# Patient Record
Sex: FEMALE | Race: ASIAN | NOT HISPANIC OR LATINO
[De-identification: names, ages, dates, MRNs, and addresses within clinical notes are randomized per-mention and may not be internally consistent; named-entity substitution may affect disease eponyms.]

---

## 2022-11-29 PROBLEM — Z00.00 ENCOUNTER FOR PREVENTIVE HEALTH EXAMINATION: Status: ACTIVE | Noted: 2022-11-29

## 2022-12-06 ENCOUNTER — APPOINTMENT (OUTPATIENT)
Dept: OBGYN | Facility: HOSPITAL | Age: 37
End: 2022-12-06

## 2022-12-27 ENCOUNTER — APPOINTMENT (OUTPATIENT)
Dept: OBGYN | Facility: CLINIC | Age: 37
End: 2022-12-27
Payer: MEDICAID

## 2022-12-27 VITALS
HEIGHT: 64 IN | HEART RATE: 105 BPM | SYSTOLIC BLOOD PRESSURE: 119 MMHG | DIASTOLIC BLOOD PRESSURE: 83 MMHG | WEIGHT: 195 LBS | BODY MASS INDEX: 33.29 KG/M2

## 2022-12-27 VITALS — HEART RATE: 96 BPM | DIASTOLIC BLOOD PRESSURE: 74 MMHG | SYSTOLIC BLOOD PRESSURE: 119 MMHG

## 2022-12-27 PROCEDURE — 0502F SUBSEQUENT PRENATAL CARE: CPT

## 2022-12-29 LAB
BACTERIA UR CULT: NORMAL
GLUCOSE 1H P 50 G GLC PO SERPL-MCNC: 131 MG/DL

## 2023-01-10 ENCOUNTER — APPOINTMENT (OUTPATIENT)
Dept: OBGYN | Facility: CLINIC | Age: 38
End: 2023-01-10
Payer: MEDICAID

## 2023-01-10 VITALS
DIASTOLIC BLOOD PRESSURE: 73 MMHG | HEART RATE: 98 BPM | WEIGHT: 197 LBS | SYSTOLIC BLOOD PRESSURE: 115 MMHG | HEIGHT: 64 IN | BODY MASS INDEX: 33.63 KG/M2

## 2023-01-10 DIAGNOSIS — Z23 ENCOUNTER FOR IMMUNIZATION: ICD-10-CM

## 2023-01-10 PROCEDURE — 0502F SUBSEQUENT PRENATAL CARE: CPT

## 2023-01-10 PROCEDURE — G0008: CPT

## 2023-01-10 PROCEDURE — 90686 IIV4 VACC NO PRSV 0.5 ML IM: CPT

## 2023-01-13 LAB
BASOPHILS # BLD AUTO: 0.07 K/UL
BASOPHILS NFR BLD AUTO: 0.5 %
EOSINOPHIL # BLD AUTO: 0.12 K/UL
EOSINOPHIL NFR BLD AUTO: 0.8 %
HCT VFR BLD CALC: 31.9 %
HGB BLD-MCNC: 9.6 G/DL
IMM GRANULOCYTES NFR BLD AUTO: 2.1 %
LYMPHOCYTES # BLD AUTO: 2.69 K/UL
LYMPHOCYTES NFR BLD AUTO: 18.1 %
MAN DIFF?: NORMAL
MCHC RBC-ENTMCNC: 25 PG
MCHC RBC-ENTMCNC: 30.1 GM/DL
MCV RBC AUTO: 83.1 FL
MONOCYTES # BLD AUTO: 0.96 K/UL
MONOCYTES NFR BLD AUTO: 6.4 %
NEUTROPHILS # BLD AUTO: 10.74 K/UL
NEUTROPHILS NFR BLD AUTO: 72.1 %
PLATELET # BLD AUTO: 306 K/UL
RBC # BLD: 3.84 M/UL
RBC # FLD: 15.6 %
WBC # FLD AUTO: 14.9 K/UL

## 2023-01-18 ENCOUNTER — NON-APPOINTMENT (OUTPATIENT)
Age: 38
End: 2023-01-18

## 2023-01-18 ENCOUNTER — APPOINTMENT (OUTPATIENT)
Dept: OBGYN | Facility: CLINIC | Age: 38
End: 2023-01-18
Payer: MEDICAID

## 2023-01-18 VITALS
HEIGHT: 64 IN | WEIGHT: 198 LBS | BODY MASS INDEX: 33.8 KG/M2 | DIASTOLIC BLOOD PRESSURE: 76 MMHG | SYSTOLIC BLOOD PRESSURE: 120 MMHG

## 2023-01-18 DIAGNOSIS — R31.9 HEMATURIA, UNSPECIFIED: ICD-10-CM

## 2023-01-18 PROCEDURE — 0502F SUBSEQUENT PRENATAL CARE: CPT

## 2023-01-20 ENCOUNTER — ASOB RESULT (OUTPATIENT)
Age: 38
End: 2023-01-20

## 2023-01-20 ENCOUNTER — APPOINTMENT (OUTPATIENT)
Dept: ANTEPARTUM | Facility: CLINIC | Age: 38
End: 2023-01-20
Payer: MEDICAID

## 2023-01-20 PROCEDURE — 76805 OB US >/= 14 WKS SNGL FETUS: CPT

## 2023-02-07 ENCOUNTER — APPOINTMENT (OUTPATIENT)
Dept: OBGYN | Facility: CLINIC | Age: 38
End: 2023-02-07
Payer: MEDICAID

## 2023-02-07 ENCOUNTER — NON-APPOINTMENT (OUTPATIENT)
Age: 38
End: 2023-02-07

## 2023-02-07 VITALS
DIASTOLIC BLOOD PRESSURE: 71 MMHG | HEIGHT: 64 IN | HEART RATE: 103 BPM | SYSTOLIC BLOOD PRESSURE: 106 MMHG | BODY MASS INDEX: 34.15 KG/M2 | WEIGHT: 200 LBS

## 2023-02-07 DIAGNOSIS — Z98.891 HISTORY OF UTERINE SCAR FROM PREVIOUS SURGERY: ICD-10-CM

## 2023-02-07 PROCEDURE — 90471 IMMUNIZATION ADMIN: CPT

## 2023-02-07 PROCEDURE — 90715 TDAP VACCINE 7 YRS/> IM: CPT

## 2023-02-07 PROCEDURE — 0502F SUBSEQUENT PRENATAL CARE: CPT

## 2023-02-08 LAB — GLUCOSE 1H P 100 G GLC PO SERPL-MCNC: 158 MG/DL

## 2023-02-11 ENCOUNTER — TRANSCRIPTION ENCOUNTER (OUTPATIENT)
Age: 38
End: 2023-02-11

## 2023-02-21 ENCOUNTER — APPOINTMENT (OUTPATIENT)
Dept: OBGYN | Facility: CLINIC | Age: 38
End: 2023-02-21
Payer: MEDICAID

## 2023-02-21 ENCOUNTER — NON-APPOINTMENT (OUTPATIENT)
Age: 38
End: 2023-02-21

## 2023-02-21 VITALS
DIASTOLIC BLOOD PRESSURE: 82 MMHG | BODY MASS INDEX: 33.58 KG/M2 | SYSTOLIC BLOOD PRESSURE: 133 MMHG | HEIGHT: 64 IN | WEIGHT: 196.7 LBS

## 2023-02-21 DIAGNOSIS — Z34.90 ENCOUNTER FOR SUPERVISION OF NORMAL PREGNANCY, UNSPECIFIED, UNSPECIFIED TRIMESTER: ICD-10-CM

## 2023-02-21 DIAGNOSIS — D25.9 LEIOMYOMA OF UTERUS, UNSPECIFIED: ICD-10-CM

## 2023-02-21 PROCEDURE — 0502F SUBSEQUENT PRENATAL CARE: CPT

## 2023-02-21 PROCEDURE — 59025 FETAL NON-STRESS TEST: CPT

## 2023-02-21 RX ORDER — PRENATAL VIT 49/IRON FUM/FOLIC 6.75-0.2MG
TABLET ORAL
Refills: 0 | Status: ACTIVE | COMMUNITY

## 2023-02-22 ENCOUNTER — APPOINTMENT (OUTPATIENT)
Dept: ANTEPARTUM | Facility: CLINIC | Age: 38
End: 2023-02-22
Payer: MEDICAID

## 2023-02-22 ENCOUNTER — ASOB RESULT (OUTPATIENT)
Age: 38
End: 2023-02-22

## 2023-02-22 PROCEDURE — 76819 FETAL BIOPHYS PROFIL W/O NST: CPT | Mod: 59

## 2023-02-22 PROCEDURE — 76816 OB US FOLLOW-UP PER FETUS: CPT

## 2023-03-07 ENCOUNTER — NON-APPOINTMENT (OUTPATIENT)
Age: 38
End: 2023-03-07

## 2023-03-07 ENCOUNTER — APPOINTMENT (OUTPATIENT)
Dept: OBGYN | Facility: CLINIC | Age: 38
End: 2023-03-07
Payer: MEDICAID

## 2023-03-07 VITALS
BODY MASS INDEX: 33.97 KG/M2 | DIASTOLIC BLOOD PRESSURE: 78 MMHG | WEIGHT: 199 LBS | SYSTOLIC BLOOD PRESSURE: 116 MMHG | HEIGHT: 64 IN | HEART RATE: 98 BPM

## 2023-03-07 VITALS — HEIGHT: 64 IN

## 2023-03-07 PROCEDURE — 0502F SUBSEQUENT PRENATAL CARE: CPT

## 2023-03-21 ENCOUNTER — APPOINTMENT (OUTPATIENT)
Dept: OBGYN | Facility: CLINIC | Age: 38
End: 2023-03-21
Payer: MEDICAID

## 2023-03-21 VITALS
DIASTOLIC BLOOD PRESSURE: 69 MMHG | HEIGHT: 64 IN | WEIGHT: 202.8 LBS | HEART RATE: 102 BPM | SYSTOLIC BLOOD PRESSURE: 115 MMHG | BODY MASS INDEX: 34.62 KG/M2

## 2023-03-21 PROCEDURE — 0502F SUBSEQUENT PRENATAL CARE: CPT

## 2023-03-28 ENCOUNTER — NON-APPOINTMENT (OUTPATIENT)
Age: 38
End: 2023-03-28

## 2023-03-28 ENCOUNTER — APPOINTMENT (OUTPATIENT)
Dept: OBGYN | Facility: CLINIC | Age: 38
End: 2023-03-28
Payer: MEDICAID

## 2023-03-28 VITALS
BODY MASS INDEX: 34.31 KG/M2 | SYSTOLIC BLOOD PRESSURE: 107 MMHG | HEART RATE: 94 BPM | HEIGHT: 64 IN | WEIGHT: 201 LBS | DIASTOLIC BLOOD PRESSURE: 71 MMHG

## 2023-03-28 PROCEDURE — 0502F SUBSEQUENT PRENATAL CARE: CPT

## 2023-03-30 ENCOUNTER — ASOB RESULT (OUTPATIENT)
Age: 38
End: 2023-03-30

## 2023-03-30 ENCOUNTER — APPOINTMENT (OUTPATIENT)
Dept: ANTEPARTUM | Facility: CLINIC | Age: 38
End: 2023-03-30
Payer: MEDICAID

## 2023-03-30 PROCEDURE — 76819 FETAL BIOPHYS PROFIL W/O NST: CPT

## 2023-03-30 PROCEDURE — 76816 OB US FOLLOW-UP PER FETUS: CPT

## 2023-03-31 LAB
GP B STREP DNA SPEC QL NAA+PROBE: NOT DETECTED
SOURCE GBS: NORMAL

## 2023-04-04 ENCOUNTER — NON-APPOINTMENT (OUTPATIENT)
Age: 38
End: 2023-04-04

## 2023-04-04 ENCOUNTER — APPOINTMENT (OUTPATIENT)
Dept: OBGYN | Facility: CLINIC | Age: 38
End: 2023-04-04
Payer: MEDICAID

## 2023-04-04 ENCOUNTER — OUTPATIENT (OUTPATIENT)
Dept: OUTPATIENT SERVICES | Facility: HOSPITAL | Age: 38
LOS: 1 days | End: 2023-04-04

## 2023-04-04 VITALS
BODY MASS INDEX: 34.49 KG/M2 | SYSTOLIC BLOOD PRESSURE: 106 MMHG | WEIGHT: 202 LBS | DIASTOLIC BLOOD PRESSURE: 69 MMHG | HEIGHT: 64 IN

## 2023-04-04 VITALS
SYSTOLIC BLOOD PRESSURE: 124 MMHG | OXYGEN SATURATION: 97 % | HEART RATE: 102 BPM | DIASTOLIC BLOOD PRESSURE: 80 MMHG | WEIGHT: 198.42 LBS | RESPIRATION RATE: 16 BRPM | TEMPERATURE: 98 F | HEIGHT: 64.17 IN

## 2023-04-04 DIAGNOSIS — Z34.90 ENCOUNTER FOR SUPERVISION OF NORMAL PREGNANCY, UNSPECIFIED, UNSPECIFIED TRIMESTER: ICD-10-CM

## 2023-04-04 DIAGNOSIS — Z87.2 PERSONAL HISTORY OF DISEASES OF THE SKIN AND SUBCUTANEOUS TISSUE: Chronic | ICD-10-CM

## 2023-04-04 DIAGNOSIS — Z98.891 HISTORY OF UTERINE SCAR FROM PREVIOUS SURGERY: Chronic | ICD-10-CM

## 2023-04-04 LAB
APPEARANCE UR: CLEAR — SIGNIFICANT CHANGE UP
BILIRUB UR-MCNC: NEGATIVE — SIGNIFICANT CHANGE UP
BLD GP AB SCN SERPL QL: NEGATIVE — SIGNIFICANT CHANGE UP
COLOR SPEC: SIGNIFICANT CHANGE UP
DIFF PNL FLD: NEGATIVE — SIGNIFICANT CHANGE UP
GLUCOSE UR QL: NEGATIVE — SIGNIFICANT CHANGE UP
HCT VFR BLD CALC: 37.7 % — SIGNIFICANT CHANGE UP (ref 34.5–45)
HGB BLD-MCNC: 11.3 G/DL — LOW (ref 11.5–15.5)
KETONES UR-MCNC: ABNORMAL
LEUKOCYTE ESTERASE UR-ACNC: NEGATIVE — SIGNIFICANT CHANGE UP
MCHC RBC-ENTMCNC: 25.3 PG — LOW (ref 27–34)
MCHC RBC-ENTMCNC: 30 GM/DL — LOW (ref 32–36)
MCV RBC AUTO: 84.5 FL — SIGNIFICANT CHANGE UP (ref 80–100)
NITRITE UR-MCNC: NEGATIVE — SIGNIFICANT CHANGE UP
NRBC # BLD: 0 /100 WBCS — SIGNIFICANT CHANGE UP (ref 0–0)
NRBC # FLD: 0 K/UL — SIGNIFICANT CHANGE UP (ref 0–0)
PH UR: 6.5 — SIGNIFICANT CHANGE UP (ref 5–8)
PLATELET # BLD AUTO: 269 K/UL — SIGNIFICANT CHANGE UP (ref 150–400)
PROT UR-MCNC: NEGATIVE — SIGNIFICANT CHANGE UP
RBC # BLD: 4.46 M/UL — SIGNIFICANT CHANGE UP (ref 3.8–5.2)
RBC # FLD: 19.3 % — HIGH (ref 10.3–14.5)
RH IG SCN BLD-IMP: POSITIVE — SIGNIFICANT CHANGE UP
SP GR SPEC: 1.01 — SIGNIFICANT CHANGE UP (ref 1.01–1.05)
UROBILINOGEN FLD QL: SIGNIFICANT CHANGE UP
WBC # BLD: 16.92 K/UL — HIGH (ref 3.8–10.5)
WBC # FLD AUTO: 16.92 K/UL — HIGH (ref 3.8–10.5)

## 2023-04-04 PROCEDURE — 0502F SUBSEQUENT PRENATAL CARE: CPT

## 2023-04-04 PROCEDURE — 59025 FETAL NON-STRESS TEST: CPT

## 2023-04-04 NOTE — OB PST NOTE - NSANTHOSAYNRD_GEN_A_CORE
No. MARCUS screening performed.  STOP BANG Legend: 0-2 = LOW Risk; 3-4 = INTERMEDIATE Risk; 5-8 = HIGH Risk

## 2023-04-04 NOTE — OB PST NOTE - FALL HARM RISK - UNIVERSAL INTERVENTIONS
Bed in lowest position, wheels locked, appropriate side rails in place/Call bell, personal items and telephone in reach/Instruct patient to call for assistance before getting out of bed or chair/Non-slip footwear when patient is out of bed/[place] to call system/Physically safe environment - no spills, clutter or unnecessary equipment/Purposeful Proactive Rounding/Room/bathroom lighting operational, light cord in reach

## 2023-04-04 NOTE — OB PST NOTE - PROBLEM SELECTOR PLAN 1
Pre-op instructions provided. Medication instructions per OB. Pt verbalized understanding.  Pt given detailed verbal and written instructions on chlorhexidine wash. Pt verbalized understanding with teachback.   Order placed for preop COVID PCR testing.

## 2023-04-04 NOTE — OB PST NOTE - NSHPREVIEWOFSYSTEMS_GEN_ALL_CORE
General: No fever, chills, sweating, anorexia, . No polyphagia, polyurea, polydypsia.    Skin: No rashes, itching, or dryness. No change in size/color of moles.     Breast: No tenderness, lumps, or nipple discharge      Ophthalmologic: No diplopia, photophobia, lacrimation, blurred Vision , or eye discharge    ENMT Symptoms: No hearing difficulty, ear pain, tinnitus, or vertigo. No sinus symptoms, nasal congestion, nasal   discharge, or nasal obstruction    Respiratory and Thorax: No wheezing, dyspnea, cough, hemoptysis, or pleuritic chest pain     Cardiovascular: Some pedal edema toward end of pregnancy. No chest pain, palpitations, dyspnea on exertion, orthopnea, paroxysmal nocturnal dyspnea,  or claudication    Gastrointestinal: No nausea, vomiting, diarrhea, constipation, change in bowel habits, flatulence, abdominal pain, or melena    Genitourinary/ Pelvis: No hematuria, renal colic, or flank pain.  No urine discoloration, incontinence, dysuria, or urinary hesitancy. Normal urinary frequency. No nocturia, abnormal vaginal bleeding, vaginal discharge, spotting, pelvic pain, or vaginal leakage    Musculoskeletal: No arthralgia, arthritis, joint swelling, muscle weakness, neck pain, arm pain, or leg pain    Neurological: No transient paralysis, weakness, paresthesias, or seizures. No syncope, tremors, vertigo, loss of sensation, difficulty walking, loss of consciousness, hemiparesis, confusion, or facial palsy    Psychiatric: No suicidal ideation, depression, anxiety, insomnia, memory loss, paranoia, mood swings, agitation, hallucinations, or hyperactivity    Hematology: No gum bleeding, nose bleeding, or skin lumps    Lymphatic: No enlarged or tender lymph nodes. No extremity swelling    Endocrine: No heat or cold intolerance    Immunologic: No recurrent or persistent infections

## 2023-04-04 NOTE — OB PST NOTE - HISTORY OF PRESENT ILLNESS
37 year old female presents today for presurgical evaluation for ... 37 year old female presents today for presurgical evaluation for Repeat  Section.

## 2023-04-04 NOTE — OB PST NOTE - NSHPPHYSICALEXAM_GEN_ALL_CORE
Constitutional: Well Developed, Well Groomed, Well Nourished, No Distress    Eyes: PERRL, EOMI, conjunctiva clear    Ears: Normal    Mouth & Gums: Normal, moist    Pharynx: No redness, discharge.    Tonsils: No Redness, discharge,  or swelling    Neck: Supple    Breast: exam declined    Back: Normal shape, ROM intact, strength intact,     Respiratory: Airway patent, breath sounds equal, good air movement, respiration non-labored, clear to auscultation bilateral    Cardiovascular:  Regular rate and rhythm, no murmur    Gastrointestinal: Soft, non-tender, bowel sound normal,  no rebound tenderness    Extremities: 1+pedal edema    Neurological: alert & oriented x 3, sensation intact,  normal strength    Skin: warm and dry, normal color    Lymph Nodes: normal posterior cervical lymph node, normal anterior cervical lymph node, normal supraclavicular lymph node    Musculoskeletal: ROM intact, no joint swelling, warmth, or calf tenderness. Normal strength    Psychiatric: normal affect, normal behavior

## 2023-04-06 ENCOUNTER — OUTPATIENT (OUTPATIENT)
Dept: INPATIENT UNIT | Facility: HOSPITAL | Age: 38
LOS: 1 days | Discharge: HOME | End: 2023-04-06
Payer: MEDICAID

## 2023-04-06 ENCOUNTER — APPOINTMENT (OUTPATIENT)
Dept: ANTEPARTUM | Facility: CLINIC | Age: 38
End: 2023-04-06
Payer: MEDICAID

## 2023-04-06 ENCOUNTER — ASOB RESULT (OUTPATIENT)
Age: 38
End: 2023-04-06

## 2023-04-06 ENCOUNTER — NON-APPOINTMENT (OUTPATIENT)
Age: 38
End: 2023-04-06

## 2023-04-06 VITALS
RESPIRATION RATE: 17 BRPM | HEART RATE: 104 BPM | TEMPERATURE: 99 F | DIASTOLIC BLOOD PRESSURE: 77 MMHG | SYSTOLIC BLOOD PRESSURE: 126 MMHG

## 2023-04-06 VITALS — DIASTOLIC BLOOD PRESSURE: 74 MMHG | SYSTOLIC BLOOD PRESSURE: 119 MMHG | HEART RATE: 88 BPM

## 2023-04-06 DIAGNOSIS — Z87.2 PERSONAL HISTORY OF DISEASES OF THE SKIN AND SUBCUTANEOUS TISSUE: Chronic | ICD-10-CM

## 2023-04-06 DIAGNOSIS — O26.899 OTHER SPECIFIED PREGNANCY RELATED CONDITIONS, UNSPECIFIED TRIMESTER: ICD-10-CM

## 2023-04-06 DIAGNOSIS — Z98.891 HISTORY OF UTERINE SCAR FROM PREVIOUS SURGERY: Chronic | ICD-10-CM

## 2023-04-06 PROCEDURE — 99213 OFFICE O/P EST LOW 20 MIN: CPT | Mod: 25

## 2023-04-06 PROCEDURE — 76819 FETAL BIOPHYS PROFIL W/O NST: CPT | Mod: 26

## 2023-04-06 PROCEDURE — 76815 OB US LIMITED FETUS(S): CPT | Mod: 26

## 2023-04-06 PROCEDURE — 59025 FETAL NON-STRESS TEST: CPT | Mod: 26

## 2023-04-06 NOTE — OB PROVIDER TRIAGE NOTE - NS_OBGYNHISTORY_OBGYN_ALL_OB_FT
* patient unaware of fetal weight for c/s x 3  12/3/2008 primary c/s for unknown reason in [place]  10/14/00861 repeat c/s in [place]  1/6/2016 repeat c/s in [place]

## 2023-04-06 NOTE — OB PROVIDER TRIAGE NOTE - NS_FHRDECEL_OBGYN_ALL_OB
No Decelerations [FreeTextEntry1] : CPE [de-identified] : 58 yr old presents for above, feels well overall.  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 Struggles with mult medical conditions including Asthma and anxiety, reports both have been under good control. Sees Therapist weekly basis for long time.

## 2023-04-06 NOTE — OB PROVIDER TRIAGE NOTE - HISTORY OF PRESENT ILLNESS
PNC:     Patient is a 38 y/o EDC 2023 EGA 38  reports of no fetal movement since this morning. Patient at time of report no fetal movement. Denies cramping, loss of fluid, vaginal bleeding.     AP complications:   -3/28/2023 GBS negative  -3/30/2023 cephalic, anterior placenta, BETTIE 19.3, 8/8 BPP, 2636 5lb 13 ounces, 17%tile    PNC:     Patient is a 36 y/o EDC 2023 EGA 38  reports of no fetal movement since this morning. Patient at time of report no fetal movement. Denies cramping, loss of fluid, vaginal bleeding.     AP complications:   -3/28/2023 GBS negative  -3/30/2023 cephalic, anterior placenta, BETTIE 19.3, 8/8 BPP, 2636 5lb 13 ounces, 17%tile   -2023 scheduled for repeat c/s

## 2023-04-06 NOTE — OB PROVIDER TRIAGE NOTE - ADDITIONAL INSTRUCTIONS
Patient is a 38 y/o EDC 2023 EGA 38 , hx decreased fetal movement.  -reassuring fetal and maternal status    - Discussed with Dr. Flores  - Patient to be discharged home with follow up and return precautions  - Please follow up with your obstetrician at your next scheduled appointment on .  - Please return for decreased/no fetal movement, vaginal bleeding similar to that of a period, leaking/gush of fluid, regular contractions.  - Patient and partner and educated of plan and demonstrate understanding. All questions answered. Discharge instructions provided and signed.   - Discharged at 1736

## 2023-04-06 NOTE — OB RN TRIAGE NOTE - NSLDARRIVAL_OBGYN_ALL_OB_DIFF_HHMM
Telephone Encounter by Aide Ferro RN at 02/06/17 01:38 PM     Author:  Aide Ferro RN Service:  (none) Author Type:  Registered Nurse     Filed:  02/06/17 01:38 PM Encounter Date:  2/4/2017 Status:  Signed     :  Aide Ferro RN (Registered Nurse)            Sent to  [facility].[KR1.1M]         Revision History        User Key Date/Time User Provider Type Action    > KR1.1 02/06/17 01:38 PM Aide Ferro, RN Registered Nurse Sign    M - Manual             1 Hour(s) 1 Hour(s) 1 Minute(s)

## 2023-04-06 NOTE — OB PROVIDER TRIAGE NOTE - NSHPPHYSICALEXAM_GEN_ALL_CORE
Vital Signs Last 24 Hrs  T(C): 37 (06 Apr 2023 16:28), Max: 37 (06 Apr 2023 16:28)  T(F): 98.6 (06 Apr 2023 16:28), Max: 98.6 (06 Apr 2023 16:28)  HR: 104 (06 Apr 2023 16:38) (104 - 104)  BP: 126/77 (06 Apr 2023 16:38) (126/77 - 126/77)  RR: 17 (06 Apr 2023 16:28) (17 - 17)  NST in progress started at 1640  FHR:  CTX: Vital Signs Last 24 Hrs  T(C): 37 (06 Apr 2023 16:28), Max: 37 (06 Apr 2023 16:28)  T(F): 98.6 (06 Apr 2023 16:28), Max: 98.6 (06 Apr 2023 16:28)  HR: 104 (06 Apr 2023 16:38) (104 - 104)  BP: 126/77 (06 Apr 2023 16:38) (126/77 - 126/77)  RR: 17 (06 Apr 2023 16:28) (17 - 17)    abdomen soft, non tender  NST in progress started at 1640  FHR: reactive nst  toco: no ctx  tas: report saved in asob, vertex, anterior placenta, m-mode 167bpm, lorna 13.72cm, bpp 8/8

## 2023-04-06 NOTE — OB PROVIDER TRIAGE NOTE - NSOBPROVIDERNOTE_OBGYN_ALL_OB_FT
Patient is a 36 y/o EDC 2023 EGA 38  being evaluated for no fetal movement since this morning.  - NST in progress  - patient in lateral position  - patient for abdominal transabdominal   - D&T team updated with patient's history and assessment. Patient is a 36 y/o EDC 2023 EGA 38  being evaluated for no fetal movement since this morning.  - NST in progress  - patient in lateral position  - patient for abdominal transabdominal   - D&T team updated with patient's history and assessment.    -pt reports good fetal movement in D&T. Patient is a 36 y/o EDC 2023 EGA 38  being evaluated for no fetal movement since this morning.  - NST in progress  - patient in lateral position  - patient for abdominal transabdominal   - D&T team updated with patient's history and assessment.    -pt reports good fetal movement in D&T.  -reactive nst.  -efm complete    Patient is a 36 y/o EDC 2023 EGA 38 , hx decreased fetal movement.  -reassuring fetal and maternal status    - Discussed with Dr. Flores  - Patient to be discharged home with follow up and return precautions  - Please follow up with your obstetrician at your next scheduled appointment on .  - Please return for decreased/no fetal movement, vaginal bleeding similar to that of a period, leaking/gush of fluid, regular contractions.  - Patient and partner and educated of plan and demonstrate understanding. All questions answered. Discharge instructions provided and signed.   - Discharged at 1735

## 2023-04-07 DIAGNOSIS — E03.9 HYPOTHYROIDISM, UNSPECIFIED: ICD-10-CM

## 2023-04-07 DIAGNOSIS — Z3A.38 38 WEEKS GESTATION OF PREGNANCY: ICD-10-CM

## 2023-04-07 DIAGNOSIS — O34.219 MATERNAL CARE FOR UNSPECIFIED TYPE SCAR FROM PREVIOUS CESAREAN DELIVERY: ICD-10-CM

## 2023-04-07 DIAGNOSIS — O36.8130 DECREASED FETAL MOVEMENTS, THIRD TRIMESTER, NOT APPLICABLE OR UNSPECIFIED: ICD-10-CM

## 2023-04-07 DIAGNOSIS — O99.283 ENDOCRINE, NUTRITIONAL AND METABOLIC DISEASES COMPLICATING PREGNANCY, THIRD TRIMESTER: ICD-10-CM

## 2023-04-07 DIAGNOSIS — O09.523 SUPERVISION OF ELDERLY MULTIGRAVIDA, THIRD TRIMESTER: ICD-10-CM

## 2023-04-11 ENCOUNTER — NON-APPOINTMENT (OUTPATIENT)
Age: 38
End: 2023-04-11

## 2023-04-11 ENCOUNTER — APPOINTMENT (OUTPATIENT)
Dept: OBGYN | Facility: CLINIC | Age: 38
End: 2023-04-11
Payer: MEDICAID

## 2023-04-11 VITALS
SYSTOLIC BLOOD PRESSURE: 123 MMHG | DIASTOLIC BLOOD PRESSURE: 78 MMHG | HEIGHT: 64 IN | BODY MASS INDEX: 34.31 KG/M2 | WEIGHT: 201 LBS

## 2023-04-11 PROCEDURE — 0502F SUBSEQUENT PRENATAL CARE: CPT

## 2023-04-13 ENCOUNTER — TRANSCRIPTION ENCOUNTER (OUTPATIENT)
Age: 38
End: 2023-04-13

## 2023-04-14 ENCOUNTER — INPATIENT (INPATIENT)
Facility: HOSPITAL | Age: 38
LOS: 1 days | Discharge: HOME | End: 2023-04-16
Attending: OBSTETRICS & GYNECOLOGY | Admitting: OBSTETRICS & GYNECOLOGY
Payer: MEDICAID

## 2023-04-14 ENCOUNTER — APPOINTMENT (OUTPATIENT)
Dept: OBGYN | Facility: HOSPITAL | Age: 38
End: 2023-04-14

## 2023-04-14 ENCOUNTER — RESULT REVIEW (OUTPATIENT)
Age: 38
End: 2023-04-14

## 2023-04-14 VITALS — DIASTOLIC BLOOD PRESSURE: 73 MMHG | HEART RATE: 106 BPM | SYSTOLIC BLOOD PRESSURE: 108 MMHG

## 2023-04-14 DIAGNOSIS — Z98.891 HISTORY OF UTERINE SCAR FROM PREVIOUS SURGERY: Chronic | ICD-10-CM

## 2023-04-14 DIAGNOSIS — Z87.2 PERSONAL HISTORY OF DISEASES OF THE SKIN AND SUBCUTANEOUS TISSUE: Chronic | ICD-10-CM

## 2023-04-14 DIAGNOSIS — Z34.90 ENCOUNTER FOR SUPERVISION OF NORMAL PREGNANCY, UNSPECIFIED, UNSPECIFIED TRIMESTER: ICD-10-CM

## 2023-04-14 LAB
BASOPHILS # BLD AUTO: 0.07 K/UL — SIGNIFICANT CHANGE UP (ref 0–0.2)
BASOPHILS NFR BLD AUTO: 0.5 % — SIGNIFICANT CHANGE UP (ref 0–2)
BLD GP AB SCN SERPL QL: NEGATIVE — SIGNIFICANT CHANGE UP
COVID-19 SPIKE DOMAIN AB INTERP: POSITIVE
COVID-19 SPIKE DOMAIN ANTIBODY RESULT: >250 U/ML — HIGH
EOSINOPHIL # BLD AUTO: 0.11 K/UL — SIGNIFICANT CHANGE UP (ref 0–0.5)
EOSINOPHIL NFR BLD AUTO: 0.8 % — SIGNIFICANT CHANGE UP (ref 0–6)
HCT VFR BLD CALC: 36.1 % — SIGNIFICANT CHANGE UP (ref 34.5–45)
HGB BLD-MCNC: 11.2 G/DL — LOW (ref 11.5–15.5)
IANC: 10.23 K/UL — HIGH (ref 1.8–7.4)
IMM GRANULOCYTES NFR BLD AUTO: 1.8 % — HIGH (ref 0–0.9)
LYMPHOCYTES # BLD AUTO: 16.7 % — SIGNIFICANT CHANGE UP (ref 13–44)
LYMPHOCYTES # BLD AUTO: 2.36 K/UL — SIGNIFICANT CHANGE UP (ref 1–3.3)
MCHC RBC-ENTMCNC: 26.1 PG — LOW (ref 27–34)
MCHC RBC-ENTMCNC: 31 GM/DL — LOW (ref 32–36)
MCV RBC AUTO: 84.1 FL — SIGNIFICANT CHANGE UP (ref 80–100)
MONOCYTES # BLD AUTO: 1.09 K/UL — HIGH (ref 0–0.9)
MONOCYTES NFR BLD AUTO: 7.7 % — SIGNIFICANT CHANGE UP (ref 2–14)
NEUTROPHILS # BLD AUTO: 10.23 K/UL — HIGH (ref 1.8–7.4)
NEUTROPHILS NFR BLD AUTO: 72.5 % — SIGNIFICANT CHANGE UP (ref 43–77)
NRBC # BLD: 0 /100 WBCS — SIGNIFICANT CHANGE UP (ref 0–0)
NRBC # FLD: 0 K/UL — SIGNIFICANT CHANGE UP (ref 0–0)
PLATELET # BLD AUTO: 259 K/UL — SIGNIFICANT CHANGE UP (ref 150–400)
RBC # BLD: 4.29 M/UL — SIGNIFICANT CHANGE UP (ref 3.8–5.2)
RBC # FLD: 18.4 % — HIGH (ref 10.3–14.5)
RH IG SCN BLD-IMP: POSITIVE — SIGNIFICANT CHANGE UP
SARS-COV-2 IGG+IGM SERPL QL IA: >250 U/ML — HIGH
SARS-COV-2 IGG+IGM SERPL QL IA: POSITIVE
T PALLIDUM AB TITR SER: NEGATIVE — SIGNIFICANT CHANGE UP
WBC # BLD: 14.11 K/UL — HIGH (ref 3.8–10.5)
WBC # FLD AUTO: 14.11 K/UL — HIGH (ref 3.8–10.5)

## 2023-04-14 PROCEDURE — 59515 CESAREAN DELIVERY: CPT | Mod: U9,UB,GC

## 2023-04-14 PROCEDURE — 88302 TISSUE EXAM BY PATHOLOGIST: CPT | Mod: 26

## 2023-04-14 PROCEDURE — 58700 REMOVAL OF FALLOPIAN TUBE: CPT | Mod: GC

## 2023-04-14 RX ORDER — DIPHENHYDRAMINE HCL 50 MG
25 CAPSULE ORAL EVERY 6 HOURS
Refills: 0 | Status: DISCONTINUED | OUTPATIENT
Start: 2023-04-14 | End: 2023-04-16

## 2023-04-14 RX ORDER — ONDANSETRON 8 MG/1
4 TABLET, FILM COATED ORAL ONCE
Refills: 0 | Status: DISCONTINUED | OUTPATIENT
Start: 2023-04-14 | End: 2023-04-16

## 2023-04-14 RX ORDER — KETOROLAC TROMETHAMINE 30 MG/ML
30 SYRINGE (ML) INJECTION EVERY 6 HOURS
Refills: 0 | Status: DISCONTINUED | OUTPATIENT
Start: 2023-04-14 | End: 2023-04-15

## 2023-04-14 RX ORDER — ACETAMINOPHEN 500 MG
975 TABLET ORAL
Refills: 0 | Status: DISCONTINUED | OUTPATIENT
Start: 2023-04-14 | End: 2023-04-16

## 2023-04-14 RX ORDER — HEPARIN SODIUM 5000 [USP'U]/ML
5000 INJECTION INTRAVENOUS; SUBCUTANEOUS EVERY 12 HOURS
Refills: 0 | Status: DISCONTINUED | OUTPATIENT
Start: 2023-04-14 | End: 2023-04-16

## 2023-04-14 RX ORDER — LEVOTHYROXINE SODIUM 125 MCG
25 TABLET ORAL
Refills: 0 | Status: DISCONTINUED | OUTPATIENT
Start: 2023-04-14 | End: 2023-04-16

## 2023-04-14 RX ORDER — ONDANSETRON 8 MG/1
4 TABLET, FILM COATED ORAL EVERY 6 HOURS
Refills: 0 | Status: DISCONTINUED | OUTPATIENT
Start: 2023-04-14 | End: 2023-04-14

## 2023-04-14 RX ORDER — FAMOTIDINE 10 MG/ML
20 INJECTION INTRAVENOUS ONCE
Refills: 0 | Status: COMPLETED | OUTPATIENT
Start: 2023-04-14 | End: 2023-04-14

## 2023-04-14 RX ORDER — MORPHINE SULFATE 50 MG/1
0.1 CAPSULE, EXTENDED RELEASE ORAL ONCE
Refills: 0 | Status: DISCONTINUED | OUTPATIENT
Start: 2023-04-14 | End: 2023-04-14

## 2023-04-14 RX ORDER — CITRIC ACID/SODIUM CITRATE 300-500 MG
30 SOLUTION, ORAL ORAL ONCE
Refills: 0 | Status: COMPLETED | OUTPATIENT
Start: 2023-04-14 | End: 2023-04-14

## 2023-04-14 RX ORDER — LANOLIN
1 OINTMENT (GRAM) TOPICAL EVERY 6 HOURS
Refills: 0 | Status: DISCONTINUED | OUTPATIENT
Start: 2023-04-14 | End: 2023-04-16

## 2023-04-14 RX ORDER — SODIUM CHLORIDE 9 MG/ML
500 INJECTION, SOLUTION INTRAVENOUS ONCE
Refills: 0 | Status: COMPLETED | OUTPATIENT
Start: 2023-04-14 | End: 2023-04-14

## 2023-04-14 RX ORDER — OXYCODONE HYDROCHLORIDE 5 MG/1
5 TABLET ORAL
Refills: 0 | Status: DISCONTINUED | OUTPATIENT
Start: 2023-04-14 | End: 2023-04-14

## 2023-04-14 RX ORDER — OXYTOCIN 10 UNIT/ML
41.67 VIAL (ML) INJECTION
Qty: 20 | Refills: 0 | Status: DISCONTINUED | OUTPATIENT
Start: 2023-04-14 | End: 2023-04-16

## 2023-04-14 RX ORDER — IBUPROFEN 200 MG
600 TABLET ORAL EVERY 6 HOURS
Refills: 0 | Status: COMPLETED | OUTPATIENT
Start: 2023-04-14 | End: 2024-03-12

## 2023-04-14 RX ORDER — CEFAZOLIN SODIUM 1 G
2000 VIAL (EA) INJECTION ONCE
Refills: 0 | Status: COMPLETED | OUTPATIENT
Start: 2023-04-14 | End: 2023-04-14

## 2023-04-14 RX ORDER — SODIUM CHLORIDE 9 MG/ML
1000 INJECTION, SOLUTION INTRAVENOUS ONCE
Refills: 0 | Status: COMPLETED | OUTPATIENT
Start: 2023-04-14 | End: 2023-04-14

## 2023-04-14 RX ORDER — LEVOTHYROXINE SODIUM 125 MCG
50 TABLET ORAL
Refills: 0 | Status: DISCONTINUED | OUTPATIENT
Start: 2023-04-14 | End: 2023-04-16

## 2023-04-14 RX ORDER — OXYTOCIN 10 UNIT/ML
333.33 VIAL (ML) INJECTION
Qty: 20 | Refills: 0 | Status: DISCONTINUED | OUTPATIENT
Start: 2023-04-14 | End: 2023-04-14

## 2023-04-14 RX ORDER — NALBUPHINE HYDROCHLORIDE 10 MG/ML
2.5 INJECTION, SOLUTION INTRAMUSCULAR; INTRAVENOUS; SUBCUTANEOUS EVERY 6 HOURS
Refills: 0 | Status: DISCONTINUED | OUTPATIENT
Start: 2023-04-14 | End: 2023-04-14

## 2023-04-14 RX ORDER — SIMETHICONE 80 MG/1
80 TABLET, CHEWABLE ORAL EVERY 4 HOURS
Refills: 0 | Status: DISCONTINUED | OUTPATIENT
Start: 2023-04-14 | End: 2023-04-16

## 2023-04-14 RX ORDER — OXYCODONE HYDROCHLORIDE 5 MG/1
5 TABLET ORAL ONCE
Refills: 0 | Status: DISCONTINUED | OUTPATIENT
Start: 2023-04-14 | End: 2023-04-16

## 2023-04-14 RX ORDER — OXYCODONE HYDROCHLORIDE 5 MG/1
5 TABLET ORAL
Refills: 0 | Status: DISCONTINUED | OUTPATIENT
Start: 2023-04-14 | End: 2023-04-16

## 2023-04-14 RX ORDER — SODIUM CHLORIDE 9 MG/ML
1000 INJECTION, SOLUTION INTRAVENOUS
Refills: 0 | Status: DISCONTINUED | OUTPATIENT
Start: 2023-04-14 | End: 2023-04-16

## 2023-04-14 RX ORDER — DEXAMETHASONE 0.5 MG/5ML
4 ELIXIR ORAL EVERY 6 HOURS
Refills: 0 | Status: DISCONTINUED | OUTPATIENT
Start: 2023-04-14 | End: 2023-04-14

## 2023-04-14 RX ORDER — MAGNESIUM HYDROXIDE 400 MG/1
30 TABLET, CHEWABLE ORAL
Refills: 0 | Status: DISCONTINUED | OUTPATIENT
Start: 2023-04-14 | End: 2023-04-16

## 2023-04-14 RX ORDER — TETANUS TOXOID, REDUCED DIPHTHERIA TOXOID AND ACELLULAR PERTUSSIS VACCINE, ADSORBED 5; 2.5; 8; 8; 2.5 [IU]/.5ML; [IU]/.5ML; UG/.5ML; UG/.5ML; UG/.5ML
0.5 SUSPENSION INTRAMUSCULAR ONCE
Refills: 0 | Status: DISCONTINUED | OUTPATIENT
Start: 2023-04-14 | End: 2023-04-16

## 2023-04-14 RX ORDER — LEVOTHYROXINE SODIUM 125 MCG
25 TABLET ORAL DAILY
Refills: 0 | Status: DISCONTINUED | OUTPATIENT
Start: 2023-04-14 | End: 2023-04-14

## 2023-04-14 RX ORDER — NALOXONE HYDROCHLORIDE 4 MG/.1ML
0.1 SPRAY NASAL
Refills: 0 | Status: DISCONTINUED | OUTPATIENT
Start: 2023-04-14 | End: 2023-04-14

## 2023-04-14 RX ADMIN — SODIUM CHLORIDE 1000 MILLILITER(S): 9 INJECTION, SOLUTION INTRAVENOUS at 15:07

## 2023-04-14 RX ADMIN — SODIUM CHLORIDE 75 MILLILITER(S): 9 INJECTION, SOLUTION INTRAVENOUS at 12:35

## 2023-04-14 RX ADMIN — Medication 30 MILLIGRAM(S): at 19:30

## 2023-04-14 RX ADMIN — Medication 975 MILLIGRAM(S): at 22:45

## 2023-04-14 RX ADMIN — SODIUM CHLORIDE 125 MILLILITER(S): 9 INJECTION, SOLUTION INTRAVENOUS at 09:11

## 2023-04-14 RX ADMIN — Medication 30 MILLIGRAM(S): at 18:27

## 2023-04-14 RX ADMIN — Medication 125 MILLIUNIT(S)/MIN: at 12:35

## 2023-04-14 RX ADMIN — HEPARIN SODIUM 5000 UNIT(S): 5000 INJECTION INTRAVENOUS; SUBCUTANEOUS at 18:28

## 2023-04-14 RX ADMIN — SODIUM CHLORIDE 2000 MILLILITER(S): 9 INJECTION, SOLUTION INTRAVENOUS at 09:10

## 2023-04-14 RX ADMIN — Medication 975 MILLIGRAM(S): at 22:14

## 2023-04-14 RX ADMIN — Medication 30 MILLILITER(S): at 09:11

## 2023-04-14 RX ADMIN — FAMOTIDINE 20 MILLIGRAM(S): 10 INJECTION INTRAVENOUS at 09:10

## 2023-04-14 NOTE — OB RN INTRAOPERATIVE NOTE - NSSELHIDDEN_OBGYN_ALL_OB_FT
[NS_DeliveryAttending1_OBGYN_ALL_OB_FT:[id number]],[NS_DeliveryRN_OBGYN_ALL_OB_FT:[id number]=]

## 2023-04-14 NOTE — OB RN DELIVERY SUMMARY - NS_SEPSISRSKCALC_OBGYN_ALL_OB_FT
No temperature has been documented for this patient in CPN or on the OB Flowsheet. Ensure the highest temperature during labor was documented on the OB Flowsheet.  No gestational age at birth has been documented. Ensure delivery date/time has been entered above.  Rupture of membranes must be entered above.   EOS calculated successfully. EOS Risk Factor: 0.5/1000 live births ([facility] national incidence); GA=39w;Temp=97.3; ROM=0; GBS='Negative'; Antibiotics='No antibiotics or any antibiotics < 2 hrs prior to birth'

## 2023-04-14 NOTE — OB PROVIDER H&P - NSMODPPHRISK_OBGYN_A_OB
Prior  birth(s) or uterine surgery/History of previous postpartum hemorrhage/Large Uterine fibroids/AMA - over 35 years of age

## 2023-04-14 NOTE — OB PROVIDER DELIVERY SUMMARY - NSSELHIDDEN_OBGYN_ALL_OB_FT
[NS_DeliveryAttending1_OBGYN_ALL_OB_FT:[id number]],[NS_DeliveryRN_OBGYN_ALL_OB_FT:[id number]=]

## 2023-04-14 NOTE — OB RN DELIVERY SUMMARY - NSSELHIDDEN_OBGYN_ALL_OB_FT
[NS_DeliveryAttending1_OBGYN_ALL_OB_FT:[id number]],[NS_DeliveryRN_OBGYN_ALL_OB_FT:[id number]=]

## 2023-04-14 NOTE — OB RN PATIENT PROFILE - ABORTIONS, OB PROFILE
2021     Chief Complaint   Patient presents with    Post-COVID Symptoms     nausea, cough, fatigue, diarrhea     HPI  Josue Phlegm (:  1970) is a 45 y.o. male, here for evaluation of the following medical concerns:    Patient is a 48-year-old male with a past medical history of anxiety/depression issues, hyperlipidemia, hypertension and recent diagnosis of Covid 19 who presents today to f/u COVID infection. Patient after the last appointment was started on ivermectin, doxycycline, Medrol Dosepak, and Phenergan. Patient had a repeat COVID-19 test this past Monday which was negative. Patient reports he is feeling better. Persistent issues with nausea, cough, fatigue, loose stools. Some dyspnea on exertion with prolonged exertion or great distances. Completed all of his prescribed medications. Interested in returning to work next week. Review of Systems   Review system as noted above, otherwise negative. Past Medical History:   Diagnosis Date    Anxiety     Depression     Hyperlipidemia     Hypertension        Prior to Visit Medications    Medication Sig Taking? Authorizing Provider   promethazine-codeine (PHENERGAN WITH CODEINE) 6.25-10 MG/5ML syrup Take 5 mLs by mouth 4 times daily as needed for Cough for up to 3 days. Yes Camila Pavon MD   ivermectin 3 MG tablet Take 10 tablets by mouth Twice a Week Take with food and a glass of water.  Yes Camila Pavon MD   hydrOXYzine (VISTARIL) 25 MG capsule take 1 capsule by mouth twice a day if needed Yes Historical Provider, MD   pravastatin (PRAVACHOL) 40 MG tablet Take 40 mg by mouth daily Yes Historical Provider, MD   irbesartan (AVAPRO) 150 MG tablet Take 150 mg by mouth nightly Yes Historical Provider, MD   escitalopram (LEXAPRO) 20 MG tablet Take 20 mg by mouth daily Yes Historical Provider, MD   buPROPion (WELLBUTRIN XL) 300 MG extended release tablet Take 300 mg by mouth every morning Yes Historical Provider, MD   gabapentin (NEURONTIN) 300 MG capsule Take 300 mg by mouth 3 times daily. Yes Historical Provider, MD        No Known Allergies    Social History     Tobacco Use    Smoking status: Current Every Day Smoker     Packs/day: 1.00     Years: 20.00     Pack years: 20.00     Types: Cigarettes    Smokeless tobacco: Never Used   Substance Use Topics    Alcohol use: Yes     Comment: occasionally         There were no vitals filed for this visit. Estimated body mass index is 35.44 kg/m² as calculated from the following:    Height as of 6/9/21: 5' 9\" (1.753 m). Weight as of 6/9/21: 240 lb (108.9 kg). Physical Exam  Constitutional:       General: He is not in acute distress. Appearance: Normal appearance. He is not ill-appearing. Eyes:      Extraocular Movements: Extraocular movements intact. Conjunctiva/sclera: Conjunctivae normal.   Pulmonary:      Effort: Pulmonary effort is normal. No respiratory distress. Skin:     Findings: No rash. Neurological:      Mental Status: He is alert and oriented to person, place, and time. Mental status is at baseline. Psychiatric:         Mood and Affect: Mood normal.         Judgment: Judgment normal.         ASSESSMENT/PLAN:  Nicolas Ruano was seen today for post-covid symptoms. Diagnoses and all orders for this visit:    COVID-19  -     CBC Auto Differential; Future  -     Comprehensive Metabolic Panel; Future  -     C-Reactive Protein; Future  -     XR CHEST (2 VW); Future  -     promethazine-codeine (PHENERGAN WITH CODEINE) 6.25-10 MG/5ML syrup; Take 5 mLs by mouth 4 times daily as needed for Cough for up to 3 days. -     ivermectin 3 MG tablet; Take 10 tablets by mouth Twice a Week Take with food and a glass of water. Fatigue, unspecified type  -     ivermectin 3 MG tablet; Take 10 tablets by mouth Twice a Week Take with food and a glass of water. Cough  -     promethazine-codeine (PHENERGAN WITH CODEINE) 6.25-10 MG/5ML syrup;  Take 5 mLs by mouth 4 times daily as needed for Cough for up to 3 days. -     ivermectin 3 MG tablet; Take 10 tablets by mouth Twice a Week Take with food and a glass of water. Diarrhea, unspecified type  Double probiotic twice daily. Vitamin D deficiency  Check vitamin D level. Mixed hyperlipidemia  Check lipid panel. Patient was primarily seen in regards to a follow-up of his Covid 19 infection. In general patient has been improving. Patient is still mildly symptomatic as noted in HPI. Medications as noted above. Patient's Covid 19 test was negative. Labs as noted above. Patient reports significant improvement with the use of ivermectin. Restart ivermectin. Patient tolerated this medication well without issue. 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 Consider Mast Cell stabilizer medication. Consider inhaler in the future if needed or nebulizer. Consider switching to/adding Luvox. Patient is cleared to return back to work this upcoming Monday. While his wife is still testing positive for Covid 19 she is unlikely contagious or infectious given that she has had illness for approximately 24 days. Patient will need to review this concern with work prior to returning. Flu Clinicor ED sooner if symptoms worsen or change. ED immediately with high fevers, progressive SOB, dyspnea, CP, calf pain/swelling, signs of dehydration, (decreased urinary output or inability to tolerate PO). Patient to follow COVID-19 guidelines. Patient verbalizes understanding and is in agreement with plan of care. All questions answered. Return in about 4 weeks (around 7/22/2021). Josue Phlegm, was evaluated through a synchronous (real-time) audio-video encounter. The patient (or guardian if applicable) is aware that this is a billable service. Verbal consent to proceed has been obtained within the past 12 months.  The visit was conducted pursuant to the emergency declaration under the [facility], [address] [place] [address] authority and the [employer] General Act. Patient identification was verified, and a caregiver was present when appropriate. The patient was located in a state where the provider was credentialed to provide care. Total time spent for this encounter: Not billed by time    --Grace Leon MD on 6/24/2021 at 3:56 PM    An electronic signature was used to authenticate this note. An electronicsignature was used to authenticate this note.     --Grace Leon MD on 6/24/21 at 1:34 PM EDT 0

## 2023-04-14 NOTE — OB PROVIDER DELIVERY SUMMARY - AMNIOTIC FLUID AMOUNT, LABOR
Detail Level: Zone
Detail Level: Generalized
Detail Level: Detailed
within normal limits
Patient Specific Counseling (Will Not Stick From Patient To Patient): viviscal

## 2023-04-14 NOTE — OB PROVIDER H&P - ASSESSMENT
Assessment/Plan: 37y  at 39w GA admitted to L&D for scheduled repeat C/S. GBSneg.      -Admit to L&D  -Consent  -LnD Admission labs  -NPO  -IV fluids   -Fetus: Cat I tracing. Continuous toco and fetal monitoring.   -GBS: Negative, no GBS ppx required     Discussed with Dr. Maynard,     Maddie Unger, PGY1

## 2023-04-14 NOTE — OB PROVIDER DELIVERY SUMMARY - NSPROVIDERDELIVERYNOTE_OBGYN_ALL_OB_FT
repeat LTCS+BS, uncomplicated  viable female infant, vertex presentation, 6#9oz, Apgars 8/9, cord gasses sent  Grossly normal fallopian tubes, uterus, and ovaries  Fallopian tubes removed with Ligasure  Surgicel powder placed over hysterotomy  Intercede placed over hysterotomy  Minimal pelvic adhesions.     EBL: 528  IVF: 1600  UOP: 150    Dictation #: [id number]    Claudia Pleitez, PGY2

## 2023-04-14 NOTE — OB PROVIDER H&P - ATTENDING COMMENTS
(0) understands/communicates without difficulty 38 y/o P3 at 39 weeks for repeat cs and bilateral salpingectomy.  Consent for rCS and possible hysterectomy signed and witnessed.  Tubal papers re-affirmed.    Amando Maynard MD 36 y/o P3 at 39 weeks for repeat cs and bilateral salpingectomy.  Consent for rCS and possible hysterectomy signed and witnessed.  Tubal papers re-affirmed.    Amando Maynard MD    agree/ Vikki Coleman MD

## 2023-04-14 NOTE — OB RN PATIENT PROFILE - NAME OF FATHER, OB PROFILE
I spoke to the patient and I informed her of Dr. Cross's message below. I scheduled the patient an appointment 4/9/19 at 3:15 pm at [facility]. I did offer sooner but she needs a late appointment. Patient verbalized understanding of information given. Patient had no further questions or concerns.   Ritchie Tyler

## 2023-04-14 NOTE — OB PROVIDER H&P - HISTORY OF PRESENT ILLNESS
HPI:  37y  at 39w GA presents to L&D for scheduled repeat C/S. KEVYN 23. Patient denies vaginal bleeding, contractions and leakage of fluid. She reports good fetal movement. Denies fevers, chills, nausea and vomiting. No other complaints at this time.   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 Unknown reason for the first c/s.  GynHx: confirms fibroid hx; denies ovarian cysts, STD hx, or abnormal PAPs   PMH: Hypothyroidism, Sebaceous cyst s/p removal   PSHx: C/S x3  Social Hx: Denies EtOH, tobacco and illicit drug use during this pregnancy; feels safe at home  Psych Hx: Denies hx of anxiety or depression  Meds: PNVs  Allergies: NKDA    Will accept blood transfusions? Yes    EFW: 3036     GBS:     Physical Exam:   T(C): --  HR: 106 (23 @ 08:18) (106 - 106)  BP: 108/73 (23 @ 08:18) (108/73 - 108/73)  RR: --  SpO2: --  Gen: NAD, A&Ox3  Heart: Nml S1,S2, RRR  Lungs: clear to auscultation bilaterally, no rales or crackles  Abd: Soft, nontender, gravid abd   Ext: No edema, moving all extremities with ease   Bedside sono:            -presentation: Breech; ant placenta   FHT: 140 baseline, mod variability, +accels, -decels  [place]: not niurka      HPI:  37y  at 39w GA presents to L&D for scheduled repeat C/S. KEVYN 23. Patient denies vaginal bleeding, contractions and leakage of fluid. She reports good fetal movement. Denies fevers, chills, nausea and vomiting. No other complaints at this time.   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 Unknown reason for the first c/s.  GynHx: confirms fibroid hx; denies ovarian cysts, STD hx, or abnormal PAPs   PMH: Hypothyroidism, Sebaceous cyst s/p removal   PSHx: C/S x3  Social Hx: Denies EtOH, tobacco and illicit drug use during this pregnancy; feels safe at home  Psych Hx: Denies hx of anxiety or depression  Meds: PNVs  Allergies: NKDA    Will accept blood transfusions? Yes    EFW: 3036     GBS: negative    Physical Exam:   T(C): --  HR: 106 (23 @ 08:18) (106 - 106)  BP: 108/73 (23 @ 08:18) (108/73 - 108/73)  RR: --  SpO2: --  Gen: NAD, A&Ox3  Heart: Nml S1,S2, RRR  Lungs: clear to auscultation bilaterally, no rales or crackles  Abd: Soft, nontender, gravid abd   Ext: No edema, moving all extremities with ease   Bedside sono:            -presentation: Vtx confirmed by Dr. Pleitez, PGY2; ant placenta   FHT: 140 baseline, mod variability, +accels, -decels  [place]: not niurka

## 2023-04-15 LAB
BASOPHILS # BLD AUTO: 0.05 K/UL — SIGNIFICANT CHANGE UP (ref 0–0.2)
BASOPHILS NFR BLD AUTO: 0.2 % — SIGNIFICANT CHANGE UP (ref 0–2)
EOSINOPHIL # BLD AUTO: 0.03 K/UL — SIGNIFICANT CHANGE UP (ref 0–0.5)
EOSINOPHIL NFR BLD AUTO: 0.1 % — SIGNIFICANT CHANGE UP (ref 0–6)
HCT VFR BLD CALC: 31.3 % — LOW (ref 34.5–45)
HGB BLD-MCNC: 9.7 G/DL — LOW (ref 11.5–15.5)
IANC: 17.38 K/UL — HIGH (ref 1.8–7.4)
IMM GRANULOCYTES NFR BLD AUTO: 1.3 % — HIGH (ref 0–0.9)
LYMPHOCYTES # BLD AUTO: 11.2 % — LOW (ref 13–44)
LYMPHOCYTES # BLD AUTO: 2.47 K/UL — SIGNIFICANT CHANGE UP (ref 1–3.3)
MCHC RBC-ENTMCNC: 25.9 PG — LOW (ref 27–34)
MCHC RBC-ENTMCNC: 31 GM/DL — LOW (ref 32–36)
MCV RBC AUTO: 83.5 FL — SIGNIFICANT CHANGE UP (ref 80–100)
MONOCYTES # BLD AUTO: 1.8 K/UL — HIGH (ref 0–0.9)
MONOCYTES NFR BLD AUTO: 8.2 % — SIGNIFICANT CHANGE UP (ref 2–14)
NEUTROPHILS # BLD AUTO: 17.38 K/UL — HIGH (ref 1.8–7.4)
NEUTROPHILS NFR BLD AUTO: 79 % — HIGH (ref 43–77)
NRBC # BLD: 0 /100 WBCS — SIGNIFICANT CHANGE UP (ref 0–0)
NRBC # FLD: 0 K/UL — SIGNIFICANT CHANGE UP (ref 0–0)
PLATELET # BLD AUTO: 245 K/UL — SIGNIFICANT CHANGE UP (ref 150–400)
RBC # BLD: 3.75 M/UL — LOW (ref 3.8–5.2)
RBC # FLD: 17.9 % — HIGH (ref 10.3–14.5)
WBC # BLD: 22.02 K/UL — HIGH (ref 3.8–10.5)
WBC # FLD AUTO: 22.02 K/UL — HIGH (ref 3.8–10.5)

## 2023-04-15 RX ORDER — IBUPROFEN 200 MG
600 TABLET ORAL EVERY 6 HOURS
Refills: 0 | Status: DISCONTINUED | OUTPATIENT
Start: 2023-04-15 | End: 2023-04-16

## 2023-04-15 RX ADMIN — Medication 975 MILLIGRAM(S): at 04:30

## 2023-04-15 RX ADMIN — Medication 975 MILLIGRAM(S): at 10:55

## 2023-04-15 RX ADMIN — Medication 30 MILLIGRAM(S): at 01:15

## 2023-04-15 RX ADMIN — Medication 975 MILLIGRAM(S): at 03:55

## 2023-04-15 RX ADMIN — Medication 975 MILLIGRAM(S): at 22:13

## 2023-04-15 RX ADMIN — Medication 600 MILLIGRAM(S): at 18:00

## 2023-04-15 RX ADMIN — Medication 30 MILLIGRAM(S): at 06:02

## 2023-04-15 RX ADMIN — Medication 25 MICROGRAM(S): at 06:02

## 2023-04-15 RX ADMIN — Medication 30 MILLIGRAM(S): at 12:33

## 2023-04-15 RX ADMIN — HEPARIN SODIUM 5000 UNIT(S): 5000 INJECTION INTRAVENOUS; SUBCUTANEOUS at 06:02

## 2023-04-15 RX ADMIN — Medication 30 MILLIGRAM(S): at 06:35

## 2023-04-15 RX ADMIN — Medication 975 MILLIGRAM(S): at 10:19

## 2023-04-15 RX ADMIN — Medication 975 MILLIGRAM(S): at 21:13

## 2023-04-15 RX ADMIN — Medication 30 MILLIGRAM(S): at 00:44

## 2023-04-15 RX ADMIN — Medication 600 MILLIGRAM(S): at 18:45

## 2023-04-15 RX ADMIN — HEPARIN SODIUM 5000 UNIT(S): 5000 INJECTION INTRAVENOUS; SUBCUTANEOUS at 18:01

## 2023-04-15 RX ADMIN — SIMETHICONE 80 MILLIGRAM(S): 80 TABLET, CHEWABLE ORAL at 10:19

## 2023-04-15 RX ADMIN — Medication 30 MILLIGRAM(S): at 12:55

## 2023-04-15 NOTE — PROGRESS NOTE ADULT - SUBJECTIVE AND OBJECTIVE BOX
OB Progress Note:  Delivery, POD#1    S: Her pain is well controlled. She is tolerating a regular diet and passing flatus. Denies N/V. Denies CP/SOB/lightheadedness/dizziness. Endorses light vaginal bleeding, less than one pad per hour. She is ambulating without difficulty. Voiding spontaneously.     O:   Vital Signs Last 24 Hrs  T(C): 36.5 (15 Apr 2023 01:30), Max: 36.9 (2023 18:53)  T(F): 97.7 (15 Apr 2023 01:30), Max: 98.4 (2023 18:53)  HR: 72 (15 Apr 2023 05:51) (62 - 106)  BP: 106/60 (15 Apr 2023 05:51) (83/63 - 115/71)  BP(mean): 83 (2023 16:35) (65 - 83)  RR: 17 (15 Apr 2023 05:51) (10 - 21)  SpO2: 99% (15 Apr 2023 05:51) (96% - 100%)    Parameters below as of 15 Apr 2023 05:51  Patient On (Oxygen Delivery Method): room air        PE:  General: NAD  Heart: extremities well-perfused  Lungs: breathing comfortably  Abdomen: Mildly distended, appropriately tender, incision c/d/i.  Extremities: No erythema, no pitting edema    Labs:  Blood type: B Positive  Rubella IgG: RPR: Negative                          9.7<L>   22.02<H> >-----------< 245    ( 04-15 @ 06:00 )             31.3<L>                        11.2<L>   14.11<H> >-----------< 259    (  @ 08:46 )             36.1

## 2023-04-15 NOTE — PROGRESS NOTE ADULT - ASSESSMENT
A/P: 36yo POD#1 s/p scheduled rLTCS; EBL: 528. Hct trend: 36.1->31.3. Patient is stable and doing well post-operatively.      #Routine Postpartum Care  - Continue with PO pain management  - Increase ambulation  - Continue regular diet  - Incision dressing removed POD1  - DVT prophylaxis with Heparin     Maddie Unger, PGY1

## 2023-04-15 NOTE — PROGRESS NOTE ADULT - SUBJECTIVE AND OBJECTIVE BOX
POST OP DAY  1  s/p   SECTION    SUBJECTIVE:  I'm ok.    PAIN SCALE SCORE: [x] Refer to charted pain scores    THERAPY:  [x  ] Spinal morphine   [  ] Epidural morphine   [  ] IV PCA Hydromorphone 1 mg/ml    OBJECTIVE:  Comfortable Appearing    SEDATION SCORE:	  [ x ] Alert	    [  ] Drowsy        [  ] Arousable	[  ] Asleep	[  ] Unresponsive    Side Effects:	  [ x ] None	     [  ] Nausea        [  ] Pruritus        [  ] Weakness   [  ] Numbness        ASSESSMENT/ PLAN   [   ] Discontinue         [  ] Continue    [ x ] Change to prn Analgesics as per primary service.    DOCUMENTATION & VERIFICATION OF CURRENT MEDS [ x ] Done    COMMENTS: No Headache.   POST OP DAY  1  s/p   SECTION    SUBJECTIVE:  I'm ok.    PAIN SCALE SCORE: [x] Refer to charted pain scores    THERAPY:  [x  ] Spinal morphine   [  ] Epidural morphine   [  ] IV PCA Hydromorphone 1 mg/ml    OBJECTIVE:  Comfortable Appearing    SEDATION SCORE:	  [ x ] Alert	    [  ] Drowsy        [  ] Arousable	[  ] Asleep	[  ] Unresponsive    Side Effects:	  [ x ] None	     [  ] Nausea        [  ] Pruritus        [  ] Weakness   [  ] Numbness        ASSESSMENT/ PLAN   [   ] Discontinue         [  ] Continue    [ x ] Change to prn Analgesics as per primary service.    DOCUMENTATION & VERIFICATION OF CURRENT MEDS [ x ] Done    COMMENTS: No Headache.  no anesthesia complications

## 2023-04-16 ENCOUNTER — TRANSCRIPTION ENCOUNTER (OUTPATIENT)
Age: 38
End: 2023-04-16

## 2023-04-16 VITALS
HEART RATE: 90 BPM | DIASTOLIC BLOOD PRESSURE: 62 MMHG | OXYGEN SATURATION: 98 % | TEMPERATURE: 98 F | SYSTOLIC BLOOD PRESSURE: 121 MMHG | RESPIRATION RATE: 18 BRPM

## 2023-04-16 RX ORDER — ACETAMINOPHEN 500 MG
2 TABLET ORAL
Qty: 0 | Refills: 0 | DISCHARGE

## 2023-04-16 RX ORDER — FERROUS SULFATE 325(65) MG
1 TABLET ORAL
Refills: 0 | DISCHARGE

## 2023-04-16 RX ORDER — LEVOTHYROXINE SODIUM 125 MCG
1 TABLET ORAL
Refills: 0 | DISCHARGE

## 2023-04-16 RX ORDER — IBUPROFEN 200 MG
1 TABLET ORAL
Qty: 0 | Refills: 0 | DISCHARGE

## 2023-04-16 RX ADMIN — Medication 25 MICROGRAM(S): at 06:00

## 2023-04-16 RX ADMIN — Medication 600 MILLIGRAM(S): at 11:29

## 2023-04-16 RX ADMIN — Medication 600 MILLIGRAM(S): at 02:10

## 2023-04-16 RX ADMIN — Medication 975 MILLIGRAM(S): at 09:30

## 2023-04-16 RX ADMIN — Medication 600 MILLIGRAM(S): at 06:00

## 2023-04-16 RX ADMIN — Medication 975 MILLIGRAM(S): at 08:30

## 2023-04-16 RX ADMIN — Medication 600 MILLIGRAM(S): at 12:30

## 2023-04-16 RX ADMIN — HEPARIN SODIUM 5000 UNIT(S): 5000 INJECTION INTRAVENOUS; SUBCUTANEOUS at 06:00

## 2023-04-16 RX ADMIN — Medication 975 MILLIGRAM(S): at 03:01

## 2023-04-16 RX ADMIN — Medication 600 MILLIGRAM(S): at 06:11

## 2023-04-16 RX ADMIN — Medication 600 MILLIGRAM(S): at 01:10

## 2023-04-16 RX ADMIN — Medication 975 MILLIGRAM(S): at 04:01

## 2023-04-16 NOTE — CHART NOTE - NSCHARTNOTEFT_GEN_A_CORE
Postpartum Note,  Section  She is a  37y woman who is now post-operative day: 2    Subjective:  The patient feels well.  She is ambulating.   She is tolerating regular diet.  She denies nausea and vomiting.  She is voiding.  Her pain is controlled.  She reports normal postpartum bleeding.  She is breastfeeding.      Physical exam:    Vital Signs Last 24 Hrs  T(C): 36.1 (2023 06:15), Max: 36.8 (15 Apr 2023 14:09)  T(F): 97 (2023 06:15), Max: 98.2 (15 Apr 2023 14:09)  HR: 89 (2023 06:15) (83 - 92)  BP: 115/70 (2023 06:15) (106/62 - 119/63)  BP(mean): --  RR: 17 (2023 06:15) (16 - 18)  SpO2: 99% (2023 06:15) (99% - 99%)    Parameters below as of 2023 06:15  Patient On (Oxygen Delivery Method): room air        Gen: NAD  Breast: Soft, nontender, not engorged.  Abdomen: Soft, nontender, no distension , firm uterine fundus at umbilicus.  Incision: Clean, dry, and intact with steri strips  Ext: No calf tenderness    LABS:                        9.7    22.02 )-----------( 245      ( 15 Apr 2023 06:00 )             31.3     Allergies    No Known Allergies        MEDICATIONS  (STANDING):  acetaminophen     Tablet .. 975 milliGRAM(s) Oral <User Schedule>  diphtheria/tetanus/pertussis (acellular) Vaccine (Adacel) 0.5 milliLiter(s) IntraMuscular once  heparin   Injectable 5000 Unit(s) SubCutaneous every 12 hours  ibuprofen  Tablet. 600 milliGRAM(s) Oral every 6 hours  lactated ringers. 1000 milliLiter(s) (75 mL/Hr) IV Continuous <Continuous>  lactated ringers. 1000 milliLiter(s) (125 mL/Hr) IV Continuous <Continuous>  levothyroxine 25 MICROGram(s) Oral <User Schedule>  levothyroxine 50 MICROGram(s) Oral <User Schedule>  ondansetron Injectable 4 milliGRAM(s) IV Push once  oxytocin Infusion 41.667 milliUNIT(s)/Min (125 mL/Hr) IV Continuous <Continuous>    MEDICATIONS  (PRN):  diphenhydrAMINE 25 milliGRAM(s) Oral every 6 hours PRN Pruritus  lanolin Ointment 1 Application(s) Topical every 6 hours PRN Sore Nipples  magnesium hydroxide Suspension 30 milliLiter(s) Oral two times a day PRN Constipation  oxyCODONE    IR 5 milliGRAM(s) Oral every 3 hours PRN Moderate to Severe Pain (4-10)  oxyCODONE    IR 5 milliGRAM(s) Oral once PRN Moderate to Severe Pain (4-10)  simethicone 80 milliGRAM(s) Chew every 4 hours PRN Gas        Assessment and Plan  POD # 2  s/p repeat  section, bilateral salpingectomy  Doing well.  Encourage ambulation.  Continue routine post op care.  Discharge home today.  Vikki Coleman MD

## 2023-04-16 NOTE — DISCHARGE NOTE OB - MEDICATION SUMMARY - MEDICATIONS TO TAKE
I will START or STAY ON the medications listed below when I get home from the hospital:    Motrin 600 mg oral tablet  -- 1 by mouth 4 times a day as needed for pain  -- Indication: For  delivery    Tylenol 500 mg oral tablet  -- 2 by mouth 4 times a day as needed for pain  -- Indication: For  delivery

## 2023-04-16 NOTE — DISCHARGE NOTE OB - CARE PROVIDER_API CALL
Vikki Coleman (MD)  Obstetrics and Gynecology  [facility] [place] [address]  Phone: [phone]  Fax: [phone]  Follow Up Time:

## 2023-04-16 NOTE — DISCHARGE NOTE OB - CARE PLAN
1 Principal Discharge DX:	 delivery delivered  Assessment and plan of treatment:	stable, routine postpartum care

## 2023-04-16 NOTE — DISCHARGE NOTE OB - PATIENT PORTAL LINK FT
You can access the FollowMyHealth Patient Portal offered by [facility] by registering at the following website: [email or web address]. By joining [facility]’s FollowMyHealth portal, you will also be able to view your health information using other applications (apps) compatible with our system.

## 2023-04-16 NOTE — DISCHARGE NOTE OB - NS MD DC FALL RISK RISK
For information on Fall & Injury Prevention, visit: [email or web address] OR  [email or web address] OR  https://www.cdc.gov/steadi/patient.html

## 2023-04-16 NOTE — DISCHARGE NOTE OB - MATERIALS PROVIDED
Vaccinations/[facility]  Screening Program/  Immunization Record/Guide to Postpartum Care/[facility] Hearing Screen Program/Shaken Baby Prevention Handout/Birth Certificate Instructions

## 2023-04-18 ENCOUNTER — APPOINTMENT (OUTPATIENT)
Dept: OBGYN | Facility: CLINIC | Age: 38
End: 2023-04-18

## 2023-04-19 ENCOUNTER — RX RENEWAL (OUTPATIENT)
Age: 38
End: 2023-04-19

## 2023-04-25 LAB
SARS-COV-2 N GENE NPH QL NAA+PROBE: NOT DETECTED
SURGICAL PATHOLOGY STUDY: SIGNIFICANT CHANGE UP

## 2023-04-25 RX ORDER — CHLORHEXIDINE GLUCONATE 4 %
325 (65 FE) LIQUID (ML) TOPICAL DAILY
Qty: 90 | Refills: 0 | Status: ACTIVE | COMMUNITY
Start: 2023-01-18 | End: 1900-01-01

## 2023-05-31 ENCOUNTER — APPOINTMENT (OUTPATIENT)
Dept: OBGYN | Facility: CLINIC | Age: 38
End: 2023-05-31
Payer: MEDICAID

## 2023-05-31 VITALS
DIASTOLIC BLOOD PRESSURE: 76 MMHG | HEIGHT: 64 IN | BODY MASS INDEX: 30.9 KG/M2 | SYSTOLIC BLOOD PRESSURE: 117 MMHG | HEART RATE: 81 BPM | WEIGHT: 181 LBS

## 2023-05-31 DIAGNOSIS — R73.09 OTHER ABNORMAL GLUCOSE: ICD-10-CM

## 2023-05-31 PROBLEM — D25.9 UTERINE MYOMA: Status: ACTIVE | Noted: 2023-05-31

## 2023-05-31 PROBLEM — Z34.90 NORMAL REPEAT PREGNANCY, ANTEPARTUM: Status: RESOLVED | Noted: 2022-12-27 | Resolved: 2023-05-31

## 2023-05-31 PROCEDURE — 0503F POSTPARTUM CARE VISIT: CPT

## 2023-05-31 RX ORDER — LEVOTHYROXINE SODIUM 0.03 MG/1
25 TABLET ORAL
Refills: 0 | Status: ACTIVE | COMMUNITY

## 2023-05-31 RX ORDER — ASCORBIC ACID 500 MG
500 TABLET ORAL DAILY
Qty: 30 | Refills: 0 | Status: DISCONTINUED | COMMUNITY
Start: 2023-01-18 | End: 2023-05-31

## 2023-05-31 NOTE — HISTORY OF PRESENT ILLNESS
[Delivery Date: ___] : on [unfilled] [Female] : Delivery History: baby girl [Wt. ___] : weighing [unfilled] [ Section] :  section [Delivery Date Was ___] : delivery date was [unfilled] [BTL] : bilateral tubal ligation completed [Girl] : baby is a girl [Infant's Name ___] : [unfilled] [___ Lbs] : [unfilled] lbs [___ Oz] : [unfilled] oz [Not Circumcised] : not circumcised [Living at Home] : is currently living at home [Bottle Feeding] : bottle feeding [Repeat C/S] : delivered by  section (repeat) [Breastfeeding] : currently nursing [Clean/Dry/Intact] : clean, dry and intact [Awake] : awake [Alert] : alert [Soft] : soft [Oriented x3] : oriented to person, place, and time [Normal] : external genitalia [No Sign of Infection] : is showing no signs of infection [None] : None [Complications:___] : no complications [Rhogam] : Rhogam was not administered [Rubella Vaccine] : Rubella vaccine was not administered [Pertussis Vaccine] : Pertussis vaccine was not administered [Postpartum Follow Up] : postpartum follow up [Erythema] : not erythematous [Swelling] : not swollen [Dehiscence] : not dehisced [Acute Distress] : no acute distress [Mass] : no breast mass [Nipple Discharge] : no nipple discharge [Tender] : non tender [Axillary LAD] : no axillary lymphadenopathy [Distended] : not distended [Depressed Mood] : not depressed [Flat Affect] : affect not flat [Labia Majora] : labia major [Labia Minora] : labia minora [Motion Tenderness] : there was no cervical motion tenderness [Tenderness] : nontender [Adnexa Tenderness] : were not tender [FreeTextEntry8] : This 37 yo P4 presents post repeat C/S with bilateral salpingectomy, for PPV; reports daily brown vaginal spotting, denies issues with voiding or stooling. [de-identified] : Stable PPV; vaginal spotting [de-identified] : Pt to monitor for persistent vaginal spotting and CB office within the next 2 weeks if brown vaginal spotting has not stopped; will schedule annual visit in 4 months

## 2024-12-10 NOTE — DISCHARGE NOTE OB - NSDCQMCOGNITION_NEU_ALL_CORE
-Primary reason for hospitalization s/p repair   -Presented after mechanical fall.   No difficulties